# Patient Record
Sex: MALE | Employment: FULL TIME | URBAN - NONMETROPOLITAN AREA
[De-identification: names, ages, dates, MRNs, and addresses within clinical notes are randomized per-mention and may not be internally consistent; named-entity substitution may affect disease eponyms.]

---

## 2024-03-12 SDOH — HEALTH STABILITY: PHYSICAL HEALTH: ON AVERAGE, HOW MANY MINUTES DO YOU ENGAGE IN EXERCISE AT THIS LEVEL?: 0 MIN

## 2024-03-12 SDOH — HEALTH STABILITY: PHYSICAL HEALTH: ON AVERAGE, HOW MANY DAYS PER WEEK DO YOU ENGAGE IN MODERATE TO STRENUOUS EXERCISE (LIKE A BRISK WALK)?: 0 DAYS

## 2024-03-12 SDOH — ECONOMIC STABILITY: FOOD INSECURITY: WITHIN THE PAST 12 MONTHS, THE FOOD YOU BOUGHT JUST DIDN'T LAST AND YOU DIDN'T HAVE MONEY TO GET MORE.: NEVER TRUE

## 2024-03-12 SDOH — ECONOMIC STABILITY: HOUSING INSECURITY: IN THE LAST 12 MONTHS, HOW MANY PLACES HAVE YOU LIVED?: 2

## 2024-03-12 SDOH — ECONOMIC STABILITY: INCOME INSECURITY: IN THE LAST 12 MONTHS, WAS THERE A TIME WHEN YOU WERE NOT ABLE TO PAY THE MORTGAGE OR RENT ON TIME?: NO

## 2024-03-12 SDOH — ECONOMIC STABILITY: FOOD INSECURITY: WITHIN THE PAST 12 MONTHS, YOU WORRIED THAT YOUR FOOD WOULD RUN OUT BEFORE YOU GOT MONEY TO BUY MORE.: NEVER TRUE

## 2024-03-12 SDOH — ECONOMIC STABILITY: HOUSING INSECURITY
IN THE LAST 12 MONTHS, WAS THERE A TIME WHEN YOU DID NOT HAVE A STEADY PLACE TO SLEEP OR SLEPT IN A SHELTER (INCLUDING NOW)?: NO

## 2024-03-12 SDOH — ECONOMIC STABILITY: INCOME INSECURITY: HOW HARD IS IT FOR YOU TO PAY FOR THE VERY BASICS LIKE FOOD, HOUSING, MEDICAL CARE, AND HEATING?: NOT HARD AT ALL

## 2024-03-12 SDOH — ECONOMIC STABILITY: TRANSPORTATION INSECURITY
IN THE PAST 12 MONTHS, HAS THE LACK OF TRANSPORTATION KEPT YOU FROM MEDICAL APPOINTMENTS OR FROM GETTING MEDICATIONS?: NO

## 2024-03-12 SDOH — ECONOMIC STABILITY: TRANSPORTATION INSECURITY
IN THE PAST 12 MONTHS, HAS LACK OF RELIABLE TRANSPORTATION KEPT YOU FROM MEDICAL APPOINTMENTS, MEETINGS, WORK OR FROM GETTING THINGS NEEDED FOR DAILY LIVING?: NO

## 2024-03-12 SDOH — HEALTH STABILITY: MENTAL HEALTH
STRESS IS WHEN SOMEONE FEELS TENSE, NERVOUS, ANXIOUS, OR CAN'T SLEEP AT NIGHT BECAUSE THEIR MIND IS TROUBLED. HOW STRESSED ARE YOU?: TO SOME EXTENT

## 2024-03-12 SDOH — ECONOMIC STABILITY: TRANSPORTATION INSECURITY
IN THE PAST 12 MONTHS, HAS LACK OF TRANSPORTATION KEPT YOU FROM MEETINGS, WORK, OR FROM GETTING THINGS NEEDED FOR DAILY LIVING?: NO

## 2024-03-12 ASSESSMENT — SOCIAL DETERMINANTS OF HEALTH (SDOH)
ARE YOU MARRIED, WIDOWED, DIVORCED, SEPARATED, NEVER MARRIED, OR LIVING WITH A PARTNER?: LIVING WITH PARTNER
HOW OFTEN DO YOU ATTEND CHURCH OR RELIGIOUS SERVICES?: NEVER
HOW OFTEN DO YOU GET TOGETHER WITH FRIENDS OR RELATIVES?: ONCE A WEEK
HOW OFTEN DO YOU HAVE SIX OR MORE DRINKS ON ONE OCCASION: NEVER
HOW MANY DRINKS CONTAINING ALCOHOL DO YOU HAVE ON A TYPICAL DAY WHEN YOU ARE DRINKING: PATIENT DOES NOT DRINK
HOW OFTEN DO YOU ATTENT MEETINGS OF THE CLUB OR ORGANIZATION YOU BELONG TO?: MORE THAN 4 TIMES PER YEAR
DO YOU BELONG TO ANY CLUBS OR ORGANIZATIONS SUCH AS CHURCH GROUPS UNIONS, FRATERNAL OR ATHLETIC GROUPS, OR SCHOOL GROUPS?: YES
IN A TYPICAL WEEK, HOW MANY TIMES DO YOU TALK ON THE PHONE WITH FAMILY, FRIENDS, OR NEIGHBORS?: ONCE A WEEK
HOW OFTEN DO YOU HAVE A DRINK CONTAINING ALCOHOL: MONTHLY OR LESS
DO YOU BELONG TO ANY CLUBS OR ORGANIZATIONS SUCH AS CHURCH GROUPS UNIONS, FRATERNAL OR ATHLETIC GROUPS, OR SCHOOL GROUPS?: YES
HOW OFTEN DO YOU ATTEND CHURCH OR RELIGIOUS SERVICES?: NEVER
HOW OFTEN DO YOU GET TOGETHER WITH FRIENDS OR RELATIVES?: ONCE A WEEK
HOW OFTEN DO YOU ATTENT MEETINGS OF THE CLUB OR ORGANIZATION YOU BELONG TO?: MORE THAN 4 TIMES PER YEAR
HOW HARD IS IT FOR YOU TO PAY FOR THE VERY BASICS LIKE FOOD, HOUSING, MEDICAL CARE, AND HEATING?: NOT HARD AT ALL
IN A TYPICAL WEEK, HOW MANY TIMES DO YOU TALK ON THE PHONE WITH FAMILY, FRIENDS, OR NEIGHBORS?: ONCE A WEEK
ARE YOU MARRIED, WIDOWED, DIVORCED, SEPARATED, NEVER MARRIED, OR LIVING WITH A PARTNER?: LIVING WITH PARTNER
WITHIN THE PAST 12 MONTHS, YOU WORRIED THAT YOUR FOOD WOULD RUN OUT BEFORE YOU GOT THE MONEY TO BUY MORE: NEVER TRUE

## 2024-03-12 ASSESSMENT — LIFESTYLE VARIABLES
HOW OFTEN DO YOU HAVE A DRINK CONTAINING ALCOHOL: MONTHLY OR LESS
SKIP TO QUESTIONS 9-10: 1
HOW MANY STANDARD DRINKS CONTAINING ALCOHOL DO YOU HAVE ON A TYPICAL DAY: PATIENT DOES NOT DRINK
AUDIT-C TOTAL SCORE: 1
HOW OFTEN DO YOU HAVE SIX OR MORE DRINKS ON ONE OCCASION: NEVER

## 2024-03-14 ENCOUNTER — OFFICE VISIT (OUTPATIENT)
Dept: MEDICAL GROUP | Facility: PHYSICIAN GROUP | Age: 54
End: 2024-03-14
Payer: COMMERCIAL

## 2024-03-14 VITALS
DIASTOLIC BLOOD PRESSURE: 90 MMHG | TEMPERATURE: 97.7 F | HEIGHT: 70 IN | RESPIRATION RATE: 14 BRPM | HEART RATE: 73 BPM | BODY MASS INDEX: 33.21 KG/M2 | SYSTOLIC BLOOD PRESSURE: 136 MMHG | OXYGEN SATURATION: 97 % | WEIGHT: 232 LBS

## 2024-03-14 DIAGNOSIS — R74.8 ELEVATED LIVER ENZYMES: ICD-10-CM

## 2024-03-14 DIAGNOSIS — K21.9 GASTROESOPHAGEAL REFLUX DISEASE, UNSPECIFIED WHETHER ESOPHAGITIS PRESENT: ICD-10-CM

## 2024-03-14 DIAGNOSIS — D75.1 ERYTHROCYTOSIS: ICD-10-CM

## 2024-03-14 DIAGNOSIS — R29.818 SUSPECTED SLEEP APNEA: ICD-10-CM

## 2024-03-14 DIAGNOSIS — Z12.5 SCREENING PSA (PROSTATE SPECIFIC ANTIGEN): ICD-10-CM

## 2024-03-14 PROCEDURE — 3080F DIAST BP >= 90 MM HG: CPT | Performed by: STUDENT IN AN ORGANIZED HEALTH CARE EDUCATION/TRAINING PROGRAM

## 2024-03-14 PROCEDURE — 3075F SYST BP GE 130 - 139MM HG: CPT | Performed by: STUDENT IN AN ORGANIZED HEALTH CARE EDUCATION/TRAINING PROGRAM

## 2024-03-14 PROCEDURE — 99386 PREV VISIT NEW AGE 40-64: CPT | Performed by: STUDENT IN AN ORGANIZED HEALTH CARE EDUCATION/TRAINING PROGRAM

## 2024-03-14 ASSESSMENT — ENCOUNTER SYMPTOMS
SHORTNESS OF BREATH: 0
HEARTBURN: 1
FEVER: 0
WHEEZING: 0
ABDOMINAL PAIN: 0
HEADACHES: 0
ORTHOPNEA: 0
PALPITATIONS: 0
COUGH: 0
CHILLS: 0
DIZZINESS: 0
FOCAL WEAKNESS: 0
NAUSEA: 0
VOMITING: 0

## 2024-03-14 ASSESSMENT — PATIENT HEALTH QUESTIONNAIRE - PHQ9: CLINICAL INTERPRETATION OF PHQ2 SCORE: 0

## 2024-03-14 NOTE — ASSESSMENT & PLAN NOTE
STOP[facility] - Sleep Apnea Screening      Flowsheet Row Most Recent Value   S - Have you been told that you SNORE? Yes   T - Are you often TIRED during the day? No   O - Do you know if you stop breathing or has anyone witnessed you stop breathing while you were asleep? (OBSTRUCTION) Yes   P - Do you have high blood PRESSURE or on medication to control high blood pressure? No   B - Is your Body Mass Index greater than 35? (BMI) No   A - Are you 50 years old or older? (AGE) Yes   N - Are you a male with a NECK circumference greater than 17 inches, or a female with a neck circumference greater than 16 inches? Yes   G - Are you male? (GENDER) Yes   STOP[facility] Total Score 5   ABIOLA Risk High Risk        5 - moderate risk   Elevated hemolgobin

## 2024-03-14 NOTE — PROGRESS NOTES
Verbal Consent given for [employer] recording software    HISTORY OF PRESENT ILLNESS: Rivas is a pleasant 53 y.o. male, new  patient who presents today to discuss medical problems as listed below:    History of Present Illness  The patient is a 53-year-old male here to establish care.    He does not have any acute medical concerns currently. He has acid reflux and takes omeprazole 20 mg 1 tablet every morning, which works well for him.    He had his last blood screen done last Tuesday, which was ordered by his primary care physician, Dr. Eden, in [place]. Dr. Eden did not do the PSA test this time.      He snores and breathes loudly at night. He does not feel tired throughout the day. He probably stops breathing for a second sometimes. He does not think he has high blood pressure. He saw an eye doctor a couple of months ago and got a new prescription.            He does not vape or smoke. He drinks 3 to 4 beers a year. He is a recreational marijuana smoker. He is not , but he has been together for 30 years with domestic partners. He works in the [employer] of [employer].   His mother is prediabetic at 87. His father had a heart attack in his mid 50s. He had high blood pressure and high cholesterol. He was a smoker. He has 2 brothers, 1 father, and 1 sister. One of his brothers passed away due to COVID-19. His other brother is healthy. His father had sleep apnea. He denies any family history of colon cancer.   He has received his most recent COVID-19 vaccine.       Current Outpatient Medications Ordered in Epic   Medication Sig Dispense Refill    OMEPRAZOLE PO Take  by mouth.       No current Epic-ordered facility-administered medications on file.       Review of systems:  Review of Systems   Constitutional:  Negative for chills and fever.   Respiratory:  Negative for cough, shortness of breath and wheezing.    Cardiovascular:  Negative for chest pain, palpitations, orthopnea and leg swelling.   Gastrointestinal:  Positive  "for heartburn. Negative for abdominal pain, melena, nausea and vomiting.   Genitourinary:  Negative for dysuria.   Musculoskeletal:  Negative for joint pain.   Neurological:  Negative for dizziness, focal weakness and headaches.         Patient Active Problem List    Diagnosis Date Noted    Suspected sleep apnea 03/14/2024    GERD (gastroesophageal reflux disease) 11/16/2009     Past Surgical History:   Procedure Laterality Date    UMBILICAL HERNIA REPAIR CHILD       Social History     Tobacco Use    Smoking status: Never    Smokeless tobacco: Never   Vaping Use    Vaping Use: Never used   Substance Use Topics    Alcohol use: Yes     Comment: occ    Drug use: Yes     Types: Inhaled, Marijuana      Family History   Problem Relation Age of Onset    No Known Problems Mother     Heart Disease Father     Hypertension Father     Hyperlipidemia Father     No Known Problems Sister     No Known Problems Brother      Current Outpatient Medications   Medication Sig Dispense Refill    OMEPRAZOLE PO Take  by mouth.       No current facility-administered medications for this visit.       Allergies:  No Known Allergies    Allergies, past medical history, past surgical history, family history, social history reviewed and updated.    Objective:    BP (!) 136/90   Pulse 73   Temp 36.5 °C (97.7 °F) (Temporal)   Resp 14   Ht 1.778 m (5' 10\")   Wt 105 kg (232 lb)   SpO2 97%   BMI 33.29 kg/m²    Body mass index is 33.29 kg/m².    Physical exam:  General: Normal appearance, no acute distress, not ill-appearing  HEENT: EOM intact, conjunctiva normal limits, negative right/left eye discharge.  Sclera anicteric  Cardiovascular: Normal rate and rhythm, no murmurs  Pulmonary: No respiratory distress, no wheezing, no rales, breath sounds normal.  Musculoskeletal: No edema bilaterally  Skin: Warm, dry, no lesions  Neurological: No focal deficits, normal gait  Psychiatric: Mood within normal limits    Assessment/Plan:    Assessment & " Plan  1. Erythrocytosis.  This is likely secondary to obstructive sleep apnea. Repeat CBC.  Sleep study ordered    2. Sleep apnea.  Sleep study ordered.    3. Hyperlipidemia.  ASCVD risk is about 6 percent. I advised on diet, exercise, and interventions.  Cont to monitor    4. Elevated ALT enzyme.  This is likely secondary to fatty liver disease.  Hepatitis labs ordered  Ruq us ordered  Advised on diet and exercise        Patient here for a preventive medicine visit today and to establish care.  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 He has not had the shingles vaccine yet and was advised to do so.     Cancer screenings:  Colorectal cancer screening: has cologuard kit    Prostate Cancer Screening/PSA: ordered     Patient Counseling:  --Discussed moderation in caloric intake, sufficient fresh fruits/vegetables, fiber  --Encouraged regular exercise.   --Discussed tobacco, alcohol, or other drug use.  --Discussed sexually transmitted infections, partner selection, use of condoms, avoidance of unintended pregnancy and contraceptive alternatives.  --Injury prevention: Discussed     Problem List Items Addressed This Visit       GERD (gastroesophageal reflux disease)     Takes omeprazol 20mg daily   Well controlled           Relevant Medications    OMEPRAZOLE PO    Suspected sleep apnea     [facility] - Sleep Apnea Screening      Flowsheet Row Most Recent Value   S - Have you been told that you SNORE? Yes   T - Are you often TIRED during the day? No   O - Do  you know if you stop breathing or has anyone witnessed you stop breathing while you were asleep? (OBSTRUCTION) Yes   P - Do you have high blood PRESSURE or on medication to control high blood pressure? No   B - Is your Body Mass Index greater than 35? (BMI) No   A - Are you 50 years old or older? (AGE) Yes   N - Are you a male with a NECK circumference greater than 17 inches, or a female with a neck circumference greater than 16 inches? Yes   G - Are you male? (GENDER) Yes   [facility] Total Score 5   ABIOLA Risk High Risk        5 - moderate risk   Elevated hemolgobin            Relevant Orders    Overnight Home Sleep Study     Other Visit Diagnoses       Erythrocytosis        Relevant Orders    CBC WITH DIFFERENTIAL    Screening PSA (prostate specific antigen)        Relevant Orders    PROSTATE SPECIFIC AG SCREENING    Elevated liver enzymes        Relevant Orders    US-RUQ    HEP C VIRUS ANTIBODY    HEP B SURFACE ANTIGEN              Return in about 6 months (around 9/14/2024), or if symptoms worsen or fail to improve.

## 2024-04-09 ENCOUNTER — TELEPHONE (OUTPATIENT)
Dept: HEALTH INFORMATION MANAGEMENT | Facility: OTHER | Age: 54
End: 2024-04-09
Payer: COMMERCIAL